# Patient Record
Sex: MALE | Race: WHITE | NOT HISPANIC OR LATINO | Employment: OTHER | URBAN - NONMETROPOLITAN AREA
[De-identification: names, ages, dates, MRNs, and addresses within clinical notes are randomized per-mention and may not be internally consistent; named-entity substitution may affect disease eponyms.]

---

## 2023-03-26 ENCOUNTER — HOSPITAL ENCOUNTER (EMERGENCY)
Facility: HOSPITAL | Age: 58
Discharge: HOME | End: 2023-03-26
Attending: EMERGENCY MEDICINE | Admitting: EMERGENCY MEDICINE
Payer: MEDICAID

## 2023-03-26 ENCOUNTER — APPOINTMENT (OUTPATIENT)
Dept: GENERAL RADIOLOGY | Facility: HOSPITAL | Age: 58
End: 2023-03-26
Payer: MEDICAID

## 2023-03-26 VITALS
DIASTOLIC BLOOD PRESSURE: 83 MMHG | TEMPERATURE: 98.2 F | HEART RATE: 69 BPM | BODY MASS INDEX: 30.49 KG/M2 | RESPIRATION RATE: 20 BRPM | HEIGHT: 65 IN | SYSTOLIC BLOOD PRESSURE: 135 MMHG | OXYGEN SATURATION: 99 % | WEIGHT: 183 LBS

## 2023-03-26 DIAGNOSIS — M70.22 OLECRANON BURSITIS OF LEFT ELBOW: Primary | ICD-10-CM

## 2023-03-26 PROCEDURE — 99283 EMERGENCY DEPT VISIT LOW MDM: CPT

## 2023-03-26 PROCEDURE — 73080 X-RAY EXAM OF ELBOW: CPT

## 2023-03-26 RX ADMIN — DICLOFENAC 2 G: 10 GEL TOPICAL at 18:45

## 2023-03-26 NOTE — DISCHARGE INSTRUCTIONS
Wear Ace bandage for compression.  Rest, ice, elevate the extremity at home.  Use Voltaren gel as prescribed.  Take naproxen as prescribed as needed for pain and swelling.  Follow-up with your PCP as needed for further outpatient evaluation.  Follow-up with Dr. Dawn, orthopedic specialist, for further outpatient evaluation and definitive care.  Return to the ER for new or worsening symptoms or acute concerns.

## 2023-03-26 NOTE — ED PROVIDER NOTES
"Subjective:  Chief Complaint:  Elbow swelling    History of Present Illness:  Patient is a 57-year-old male with history of diabetes and hyperlipidemia presenting to the ER with complaints of left elbow swelling.  He states that it is not really painful but just swollen.  He states he noticed it about 1 to 2 weeks ago.  He states that he does have some pain to the inside of his elbows on both sides but he believes this is from cutting down a tree yesterday.  He denies any pain to the swollen area over his elbow.  He denies any recent fall or trauma.      Nurses Notes reviewed and agree, including vitals, allergies, social history and prior medical history.     REVIEW OF SYSTEMS: All systems reviewed and not pertinent unless noted.  Review of Systems   Musculoskeletal:        Left elbow swelling   All other systems reviewed and are negative.      Past Medical History:   Diagnosis Date   • Diabetes mellitus (HCC)    • Hyperlipidemia        Allergies:    Patient has no known allergies.      History reviewed. No pertinent surgical history.      Social History     Socioeconomic History   • Marital status: Single   Tobacco Use   • Smoking status: Never   Vaping Use   • Vaping Use: Never used   Substance and Sexual Activity   • Alcohol use: Never   • Drug use: Never   • Sexual activity: Defer         History reviewed. No pertinent family history.    Objective  Physical Exam:  /77 (BP Location: Left arm, Patient Position: Sitting)   Pulse 72   Temp 98.2 °F (36.8 °C) (Oral)   Resp 18   Ht 165.1 cm (65\")   Wt 83 kg (183 lb)   SpO2 97%   BMI 30.45 kg/m²      Physical Exam  Vitals and nursing note reviewed.   Constitutional:       General: He is not in acute distress.     Appearance: He is not toxic-appearing.   HENT:      Head: Normocephalic and atraumatic.      Right Ear: External ear normal.      Left Ear: External ear normal.      Nose: Nose normal.   Eyes:      Extraocular Movements: Extraocular movements " intact.      Conjunctiva/sclera: Conjunctivae normal.   Cardiovascular:      Rate and Rhythm: Normal rate.   Pulmonary:      Effort: Pulmonary effort is normal. No respiratory distress.   Abdominal:      General: There is no distension.      Palpations: Abdomen is soft.   Musculoskeletal:         General: Normal range of motion.      Cervical back: Normal range of motion and neck supple.      Comments: Swelling noted over olecranon process, consistent with olecranon bursitis; no erythema, warmth, or tenderness noted, neurovascularly intact   Skin:     General: Skin is warm and dry.   Neurological:      General: No focal deficit present.      Mental Status: He is alert and oriented to person, place, and time.   Psychiatric:         Mood and Affect: Mood normal.         Behavior: Behavior normal.         Procedures    ED Course:         Lab Results (last 24 hours)     ** No results found for the last 24 hours. **           No radiology results from the last 24 hrs       MDM  Patient was evaluated in the ER for left elbow swelling without significant pain.  He is hemodynamically stable, afebrile, nontoxic-appearing on exam.  Differential diagnosis includes but is not limited to bursitis, tendinitis, fracture, dislocation, among others.  On exam, patient does have swelling noted over the olecranon process, consistent with olecranon bursitis.  There are no signs of acute infection.  No erythema, warmth, or tenderness.  Initial plan includes x-ray and treatment with Voltaren gel and an Ace wrap.  Offered to drain the bursa with a needle but advised patient that if it is not causing him any pain or significant symptoms we could wrap it with an Ace bandage and have him follow-up with orthopedic specialist.  He is agreeable with this plan.    X-ray was reviewed by myself and did reveal a bone spur and soft tissue swelling consistent with olecranon bursitis.  Elbow was wrapped with an Ace bandage.  RICE therapy was  recommended.  Patient was given Voltaren gel to use at home.  He was advised to follow-up with his PCP as needed.  He was given information for Dr. Dawn, orthopedic specialist, for further outpatient evaluation and definitive care if symptoms persist.  Precautions were given for return to the ER for any new or worsening symptoms.    Final diagnoses:   Olecranon bursitis of left elbow        Belkis Feliz PA-C  03/26/23 [id number]

## 2023-04-01 ENCOUNTER — HOSPITAL ENCOUNTER (EMERGENCY)
Facility: HOSPITAL | Age: 58
Discharge: HOME | End: 2023-04-01
Attending: EMERGENCY MEDICINE | Admitting: EMERGENCY MEDICINE
Payer: MEDICAID

## 2023-04-01 ENCOUNTER — APPOINTMENT (OUTPATIENT)
Dept: GENERAL RADIOLOGY | Facility: HOSPITAL | Age: 58
End: 2023-04-01
Payer: MEDICAID

## 2023-04-01 VITALS
TEMPERATURE: 98.2 F | RESPIRATION RATE: 18 BRPM | OXYGEN SATURATION: 99 % | BODY MASS INDEX: 30.49 KG/M2 | SYSTOLIC BLOOD PRESSURE: 120 MMHG | DIASTOLIC BLOOD PRESSURE: 74 MMHG | WEIGHT: 183 LBS | HEART RATE: 78 BPM | HEIGHT: 65 IN
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
## 2023-04-01 DIAGNOSIS — S80.11XA TRAUMATIC ECCHYMOSIS OF RIGHT LOWER LEG, INITIAL ENCOUNTER: ICD-10-CM

## 2023-04-01 PROCEDURE — 99283 EMERGENCY DEPT VISIT LOW MDM: CPT

## 2023-04-01 PROCEDURE — 73552 X-RAY EXAM OF FEMUR 2/>: CPT

## 2023-04-01 RX ORDER — LIDOCAINE 50 MG/G
1 PATCH TOPICAL
Status: DISCONTINUED | OUTPATIENT
Start: 2023-04-01 | End: 2023-04-01 | Stop reason: HOSPADM

## 2023-04-01 RX ORDER — LIDOCAINE 50 MG/G
1 PATCH TOPICAL EVERY 24 HOURS
Qty: 30 EACH | Refills: 0 | Status: SHIPPED | OUTPATIENT
Start: 2023-04-01

## 2023-04-01 RX ADMIN — LIDOCAINE 1 PATCH: 50 PATCH TOPICAL at 11:29

## 2023-04-01 NOTE — ED PROVIDER NOTES
Subjective:  Chief Complaint:  Leg pain    History of Present Illness:  Patient is a 57-year-old male with history of diabetes and hyperlipidemia presenting to the ER with complaints of right thigh pain after falling off of a ladder a couple days ago.  Patient was seen here around a week ago and diagnosed with olecranon bursitis of the left elbow.  He states he did get a brace at [facility] and has been wearing it to keep pressure on it but does not have it on today.  He states it is still swollen but is not tender or really bothering him.  He has not made an appointment with orthopedics yet because he states he has been busy working.  However, he is not here for the elbow today and states that he is here for pain to his right thigh after falling off the ladder.  He states it is bruised and mainly hurts when he touches it.  He has been ambulating without difficulty and states that the pain is worse in the morning after he wakes up as he believes that his muscles have stiffened in the mornings.  He has been taking ibuprofen but denies any other medications prior to arrival. He states his family have stayed onto him telling him he should get checked out.    Nurses Notes reviewed and agree, including vitals, allergies, social history and prior medical history.     REVIEW OF SYSTEMS: All systems reviewed and not pertinent unless noted.  Review of Systems   Musculoskeletal:        Right thigh pain   All other systems reviewed and are negative.      Past Medical History:   Diagnosis Date   • Diabetes mellitus    • Hyperlipidemia        Allergies:    Patient has no known allergies.      History reviewed. No pertinent surgical history.      Social History     Socioeconomic History   • Marital status: Single   Tobacco Use   • Smoking status: Never   Vaping Use   • Vaping Use: Never used   Substance and Sexual Activity   • Alcohol use: Never   • Drug use: Never   • Sexual activity: Defer         History reviewed. No pertinent  "family history.    Objective  Physical Exam:  /79 (BP Location: Left arm, Patient Position: Sitting)   Pulse 82   Temp 98 °F (36.7 °C) (Oral)   Resp 17   Ht 165.1 cm (65\")   Wt 83 kg (183 lb)   SpO2 98%   BMI 30.45 kg/m²      Physical Exam  Vitals and nursing note reviewed.   Constitutional:       General: He is not in acute distress.     Appearance: He is normal weight. He is not toxic-appearing.   HENT:      Head: Normocephalic and atraumatic.      Right Ear: External ear normal.      Left Ear: External ear normal.      Nose: Nose normal.   Eyes:      Extraocular Movements: Extraocular movements intact.      Conjunctiva/sclera: Conjunctivae normal.   Cardiovascular:      Rate and Rhythm: Normal rate.   Pulmonary:      Effort: Pulmonary effort is normal. No respiratory distress.   Abdominal:      General: There is no distension.      Palpations: Abdomen is soft.      Tenderness: There is no abdominal tenderness.   Musculoskeletal:         General: Normal range of motion.      Cervical back: Normal range of motion and neck supple.      Comments: Large purple ecchymosis to right thigh, ROM intact, pulses 2+, sensation intact   Skin:     General: Skin is warm and dry.   Neurological:      General: No focal deficit present.      Mental Status: He is alert and oriented to person, place, and time.   Psychiatric:         Mood and Affect: Mood normal.         Behavior: Behavior normal.         Procedures    ED Course:         Lab Results (last 24 hours)     ** No results found for the last 24 hours. **           No radiology results from the last 24 hrs       MDM  Patient was evaluated in the ER for right thigh pain with large bruising to the thigh.  He is hemodynamically stable, no acute distress, nontoxic-appearing on exam.  Right lower extremity is neurovascularly intact that there is large bruising noted on exam.  Differential diagnosis includes but not limited to fracture, soft tissue injury, " sprain/strain, among others.  Initial plan includes x-ray of femur and treatment with Lidoderm patch.  Patient has been taking ibuprofen.    X-ray was reviewed by myself and ED attending, Dr. Holder.  There were no obvious acute bony abnormalities.  Patient is ambulating without difficulty.  He does have a large bruise to his right thigh which is likely the source of his pain. Prescription was provided for Lidoderm patches.  Patient was advised on RICE therapy.  He was given an Ace bandage to use at home if he wishes for compression.  He was advised to follow-up with his PCP for further outpatient evaluation if symptoms persist.  Tylenol and Motrin advised per directions on the package.  Precautions were given for return to the ER for any new or worsening symptoms.    Final diagnoses:   Fall from ladder, initial encounter   Traumatic ecchymosis of right lower leg, initial encounter        Belkis Feliz PA-C  04/01/23 [id number]

## 2023-04-01 NOTE — DISCHARGE INSTRUCTIONS
Rest, ice, elevate the extremity at home.  Use Lidoderm patches as needed as prescribed.  Take Tylenol and Motrin as needed per directions on the package.  Follow-up with your PCP for further outpatient evaluation if symptoms persist.  Return to the ER for new or worsening symptoms or acute concerns.